# Patient Record
Sex: FEMALE | Race: ASIAN | Employment: OTHER | URBAN - METROPOLITAN AREA
[De-identification: names, ages, dates, MRNs, and addresses within clinical notes are randomized per-mention and may not be internally consistent; named-entity substitution may affect disease eponyms.]

---

## 2017-01-01 ENCOUNTER — HOSPITAL ENCOUNTER (INPATIENT)
Facility: HOSPITAL | Age: 82
LOS: 2 days | Discharge: HOME | DRG: 196 | End: 2017-01-01
Attending: EMERGENCY MEDICINE | Admitting: HOSPITALIST
Payer: MEDICARE

## 2017-01-01 ENCOUNTER — TELEPHONE (OUTPATIENT)
Dept: RHEUMATOLOGY | Facility: CLINIC | Age: 82
End: 2017-01-01

## 2017-01-01 ENCOUNTER — HOSPITAL ENCOUNTER (EMERGENCY)
Facility: HOSPITAL | Age: 82
Discharge: HOME | End: 2017-01-01
Attending: EMERGENCY MEDICINE
Payer: MEDICARE

## 2017-01-01 ENCOUNTER — TELEPHONE (OUTPATIENT)
Dept: SURGERY | Facility: CLINIC | Age: 82
End: 2017-01-01

## 2017-01-01 ENCOUNTER — OFFICE VISIT (OUTPATIENT)
Dept: INTERNAL MEDICINE CLINIC | Facility: CLINIC | Age: 82
End: 2017-01-01

## 2017-01-01 ENCOUNTER — TELEPHONE (OUTPATIENT)
Dept: NEUROLOGY | Facility: CLINIC | Age: 82
End: 2017-01-01

## 2017-01-01 ENCOUNTER — APPOINTMENT (OUTPATIENT)
Dept: NUCLEAR MEDICINE | Facility: HOSPITAL | Age: 82
DRG: 196 | End: 2017-01-01
Attending: EMERGENCY MEDICINE
Payer: MEDICARE

## 2017-01-01 ENCOUNTER — APPOINTMENT (OUTPATIENT)
Dept: ULTRASOUND IMAGING | Facility: HOSPITAL | Age: 82
DRG: 196 | End: 2017-01-01
Attending: HOSPITALIST
Payer: MEDICARE

## 2017-01-01 ENCOUNTER — HOSPITAL ENCOUNTER (OUTPATIENT)
Dept: MRI IMAGING | Facility: HOSPITAL | Age: 82
Discharge: HOME | End: 2017-01-01
Attending: Other
Payer: MEDICARE

## 2017-01-01 ENCOUNTER — HOSPITAL ENCOUNTER (OUTPATIENT)
Dept: CT IMAGING | Facility: HOSPITAL | Age: 82
Discharge: HOME | End: 2017-01-01
Attending: INTERNAL MEDICINE
Payer: MEDICARE

## 2017-01-01 ENCOUNTER — PATIENT OUTREACH (OUTPATIENT)
Dept: CASE MANAGEMENT | Age: 82
End: 2017-01-01

## 2017-01-01 ENCOUNTER — APPOINTMENT (OUTPATIENT)
Dept: CV DIAGNOSTICS | Facility: HOSPITAL | Age: 82
DRG: 196 | End: 2017-01-01
Attending: HOSPITALIST
Payer: MEDICARE

## 2017-01-01 ENCOUNTER — APPOINTMENT (OUTPATIENT)
Dept: GENERAL RADIOLOGY | Facility: HOSPITAL | Age: 82
DRG: 196 | End: 2017-01-01
Attending: EMERGENCY MEDICINE
Payer: MEDICARE

## 2017-01-01 VITALS
HEART RATE: 90 BPM | BODY MASS INDEX: 22.13 KG/M2 | DIASTOLIC BLOOD PRESSURE: 72 MMHG | RESPIRATION RATE: 16 BRPM | WEIGHT: 98.38 LBS | HEIGHT: 56 IN | SYSTOLIC BLOOD PRESSURE: 130 MMHG | TEMPERATURE: 98 F | OXYGEN SATURATION: 94 %

## 2017-01-01 VITALS
HEIGHT: 57 IN | BODY MASS INDEX: 19.41 KG/M2 | HEART RATE: 90 BPM | RESPIRATION RATE: 16 BRPM | SYSTOLIC BLOOD PRESSURE: 160 MMHG | OXYGEN SATURATION: 97 % | DIASTOLIC BLOOD PRESSURE: 78 MMHG | TEMPERATURE: 98 F | WEIGHT: 90 LBS

## 2017-01-01 VITALS
BODY MASS INDEX: 19.41 KG/M2 | HEIGHT: 57 IN | WEIGHT: 89.94 LBS | RESPIRATION RATE: 20 BRPM | DIASTOLIC BLOOD PRESSURE: 79 MMHG | HEART RATE: 83 BPM | SYSTOLIC BLOOD PRESSURE: 140 MMHG | OXYGEN SATURATION: 96 % | TEMPERATURE: 98 F

## 2017-01-01 VITALS
HEIGHT: 57.5 IN | RESPIRATION RATE: 22 BRPM | OXYGEN SATURATION: 98 % | WEIGHT: 93.25 LBS | BODY MASS INDEX: 19.84 KG/M2 | HEART RATE: 92 BPM | DIASTOLIC BLOOD PRESSURE: 60 MMHG | SYSTOLIC BLOOD PRESSURE: 110 MMHG

## 2017-01-01 DIAGNOSIS — M35.00 SJOGREN'S SYNDROME, WITH UNSPECIFIED ORGAN INVOLVEMENT (HCC): ICD-10-CM

## 2017-01-01 DIAGNOSIS — J44.1 COPD EXACERBATION (HCC): ICD-10-CM

## 2017-01-01 DIAGNOSIS — J40 BRONCHITIS: ICD-10-CM

## 2017-01-01 DIAGNOSIS — D64.9 ANEMIA, UNSPECIFIED TYPE: ICD-10-CM

## 2017-01-01 DIAGNOSIS — J96.01 ACUTE RESPIRATORY FAILURE WITH HYPOXIA (HCC): Primary | ICD-10-CM

## 2017-01-01 DIAGNOSIS — J84.10 PULMONARY FIBROSIS (HCC): ICD-10-CM

## 2017-01-01 DIAGNOSIS — J47.9 ADULT BRONCHIECTASIS (HCC): ICD-10-CM

## 2017-01-01 DIAGNOSIS — J44.1 COPD EXACERBATION (HCC): Primary | ICD-10-CM

## 2017-01-01 DIAGNOSIS — G43.709 CHRONIC MIGRAINE WITHOUT AURA WITHOUT STATUS MIGRAINOSUS, NOT INTRACTABLE: ICD-10-CM

## 2017-01-01 DIAGNOSIS — R31.9 URINARY TRACT INFECTION WITH HEMATURIA, SITE UNSPECIFIED: Primary | ICD-10-CM

## 2017-01-01 DIAGNOSIS — M05.79 RHEUMATOID ARTHRITIS, SEROPOSITIVE, MULTIPLE SITES (HCC): Primary | ICD-10-CM

## 2017-01-01 DIAGNOSIS — Z02.9 ENCOUNTERS FOR ADMINISTRATIVE PURPOSE: ICD-10-CM

## 2017-01-01 DIAGNOSIS — R33.9 URINARY RETENTION: ICD-10-CM

## 2017-01-01 DIAGNOSIS — N39.0 URINARY TRACT INFECTION WITH HEMATURIA, SITE UNSPECIFIED: Primary | ICD-10-CM

## 2017-01-01 PROCEDURE — 99222 1ST HOSP IP/OBS MODERATE 55: CPT | Performed by: CLINICAL NURSE SPECIALIST

## 2017-01-01 PROCEDURE — 87086 URINE CULTURE/COLONY COUNT: CPT | Performed by: EMERGENCY MEDICINE

## 2017-01-01 PROCEDURE — 99239 HOSP IP/OBS DSCHRG MGMT >30: CPT | Performed by: HOSPITALIST

## 2017-01-01 PROCEDURE — 36415 COLL VENOUS BLD VENIPUNCTURE: CPT

## 2017-01-01 PROCEDURE — 80053 COMPREHEN METABOLIC PANEL: CPT | Performed by: EMERGENCY MEDICINE

## 2017-01-01 PROCEDURE — 99284 EMERGENCY DEPT VISIT MOD MDM: CPT

## 2017-01-01 PROCEDURE — 94640 AIRWAY INHALATION TREATMENT: CPT

## 2017-01-01 PROCEDURE — 99232 SBSQ HOSP IP/OBS MODERATE 35: CPT | Performed by: HOSPITALIST

## 2017-01-01 PROCEDURE — 71010 XR CHEST AP PORTABLE  (CPT=71010): CPT | Performed by: EMERGENCY MEDICINE

## 2017-01-01 PROCEDURE — 78582 LUNG VENTILAT&PERFUS IMAGING: CPT | Performed by: EMERGENCY MEDICINE

## 2017-01-01 PROCEDURE — 93306 TTE W/DOPPLER COMPLETE: CPT | Performed by: HOSPITALIST

## 2017-01-01 PROCEDURE — 99496 TRANSJ CARE MGMT HIGH F2F 7D: CPT | Performed by: PHYSICIAN ASSISTANT

## 2017-01-01 PROCEDURE — 99213 OFFICE O/P EST LOW 20 MIN: CPT | Performed by: NURSE PRACTITIONER

## 2017-01-01 PROCEDURE — 93970 EXTREMITY STUDY: CPT | Performed by: HOSPITALIST

## 2017-01-01 PROCEDURE — 70551 MRI BRAIN STEM W/O DYE: CPT | Performed by: OTHER

## 2017-01-01 PROCEDURE — 85025 COMPLETE CBC W/AUTO DIFF WBC: CPT | Performed by: EMERGENCY MEDICINE

## 2017-01-01 PROCEDURE — 99223 1ST HOSP IP/OBS HIGH 75: CPT | Performed by: HOSPITALIST

## 2017-01-01 PROCEDURE — 81001 URINALYSIS AUTO W/SCOPE: CPT | Performed by: EMERGENCY MEDICINE

## 2017-01-01 PROCEDURE — 71250 CT THORAX DX C-: CPT | Performed by: INTERNAL MEDICINE

## 2017-01-01 RX ORDER — IPRATROPIUM BROMIDE AND ALBUTEROL SULFATE 2.5; .5 MG/3ML; MG/3ML
3 SOLUTION RESPIRATORY (INHALATION)
Status: DISCONTINUED | OUTPATIENT
Start: 2017-01-01 | End: 2017-01-01

## 2017-01-01 RX ORDER — CODEINE PHOSPHATE AND GUAIFENESIN 10; 100 MG/5ML; MG/5ML
5 SOLUTION ORAL NIGHTLY PRN
Qty: 236 ML | Refills: 0 | Status: SHIPPED | OUTPATIENT
Start: 2017-01-01 | End: 2018-01-01

## 2017-01-01 RX ORDER — MELOXICAM 15 MG/1
15 TABLET ORAL DAILY
Qty: 30 TABLET | Refills: 1 | Status: SHIPPED | OUTPATIENT
Start: 2017-01-01 | End: 2017-01-01

## 2017-01-01 RX ORDER — CODEINE PHOSPHATE AND GUAIFENESIN 10; 100 MG/5ML; MG/5ML
10 SOLUTION ORAL NIGHTLY PRN
Qty: 236 ML | Refills: 0 | Status: SHIPPED | OUTPATIENT
Start: 2017-01-01 | End: 2017-01-01 | Stop reason: DRUGHIGH

## 2017-01-01 RX ORDER — METHYLPREDNISOLONE SODIUM SUCCINATE 125 MG/2ML
125 INJECTION, POWDER, LYOPHILIZED, FOR SOLUTION INTRAMUSCULAR; INTRAVENOUS EVERY 6 HOURS
Status: DISCONTINUED | OUTPATIENT
Start: 2017-01-01 | End: 2017-01-01

## 2017-01-01 RX ORDER — ENOXAPARIN SODIUM 100 MG/ML
40 INJECTION SUBCUTANEOUS DAILY
Status: DISCONTINUED | OUTPATIENT
Start: 2017-01-01 | End: 2017-01-01

## 2017-01-01 RX ORDER — CODEINE PHOSPHATE AND GUAIFENESIN 10; 100 MG/5ML; MG/5ML
5 SOLUTION ORAL NIGHTLY PRN
Status: DISCONTINUED | OUTPATIENT
Start: 2017-01-01 | End: 2017-01-01

## 2017-01-01 RX ORDER — ACETAMINOPHEN 325 MG/1
650 TABLET ORAL EVERY 6 HOURS PRN
Status: DISCONTINUED | OUTPATIENT
Start: 2017-01-01 | End: 2017-01-01

## 2017-01-01 RX ORDER — BUDESONIDE 0.5 MG/2ML
0.5 INHALANT ORAL 2 TIMES DAILY
Status: DISCONTINUED | OUTPATIENT
Start: 2017-01-01 | End: 2017-01-01

## 2017-01-01 RX ORDER — BACLOFEN 10 MG/1
10 TABLET ORAL 2 TIMES DAILY PRN
Qty: 20 TABLET | Refills: 1 | Status: SHIPPED | OUTPATIENT
Start: 2017-01-01

## 2017-01-01 RX ORDER — SODIUM CHLORIDE 9 MG/ML
INJECTION, SOLUTION INTRAVENOUS CONTINUOUS
Status: DISCONTINUED | OUTPATIENT
Start: 2017-01-01 | End: 2017-01-01

## 2017-01-01 RX ORDER — LORAZEPAM 2 MG/ML
0.5 INJECTION INTRAMUSCULAR ONCE
Status: COMPLETED | OUTPATIENT
Start: 2017-01-01 | End: 2017-01-01

## 2017-01-01 RX ORDER — NITROFURANTOIN 25; 75 MG/1; MG/1
100 CAPSULE ORAL ONCE
Status: COMPLETED | OUTPATIENT
Start: 2017-01-01 | End: 2017-01-01

## 2017-01-01 RX ORDER — ONDANSETRON 2 MG/ML
4 INJECTION INTRAMUSCULAR; INTRAVENOUS EVERY 4 HOURS PRN
Status: DISCONTINUED | OUTPATIENT
Start: 2017-01-01 | End: 2017-01-01

## 2017-01-01 RX ORDER — METHYLPREDNISOLONE SODIUM SUCCINATE 125 MG/2ML
60 INJECTION, POWDER, LYOPHILIZED, FOR SOLUTION INTRAMUSCULAR; INTRAVENOUS EVERY 8 HOURS
Status: DISCONTINUED | OUTPATIENT
Start: 2017-01-01 | End: 2017-01-01

## 2017-01-01 RX ORDER — PHENAZOPYRIDINE HYDROCHLORIDE 100 MG/1
TABLET, FILM COATED ORAL 3 TIMES DAILY PRN
Qty: 20 TABLET | Refills: 0 | Status: SHIPPED | OUTPATIENT
Start: 2017-01-01 | End: 2017-01-01 | Stop reason: ALTCHOICE

## 2017-01-01 RX ORDER — BACLOFEN 10 MG/1
10 TABLET ORAL 2 TIMES DAILY PRN
Status: DISCONTINUED | OUTPATIENT
Start: 2017-01-01 | End: 2017-01-01

## 2017-01-01 RX ORDER — METHYLPREDNISOLONE SODIUM SUCCINATE 125 MG/2ML
125 INJECTION, POWDER, LYOPHILIZED, FOR SOLUTION INTRAMUSCULAR; INTRAVENOUS ONCE
Status: COMPLETED | OUTPATIENT
Start: 2017-01-01 | End: 2017-01-01

## 2017-01-01 RX ORDER — IPRATROPIUM BROMIDE AND ALBUTEROL SULFATE 2.5; .5 MG/3ML; MG/3ML
3 SOLUTION RESPIRATORY (INHALATION) ONCE
Status: COMPLETED | OUTPATIENT
Start: 2017-01-01 | End: 2017-01-01

## 2017-01-01 RX ORDER — SUMATRIPTAN 100 MG/1
100 TABLET, FILM COATED ORAL DAILY PRN
COMMUNITY

## 2017-01-01 RX ORDER — PREDNISONE 20 MG/1
40 TABLET ORAL
Status: DISCONTINUED | OUTPATIENT
Start: 2017-01-01 | End: 2017-01-01

## 2017-01-01 RX ORDER — HEPARIN SODIUM 5000 [USP'U]/ML
5000 INJECTION, SOLUTION INTRAVENOUS; SUBCUTANEOUS EVERY 12 HOURS SCHEDULED
Status: DISCONTINUED | OUTPATIENT
Start: 2017-01-01 | End: 2017-01-01

## 2017-01-01 RX ORDER — IBUPROFEN 400 MG/1
400 TABLET ORAL 4 TIMES DAILY
Status: DISCONTINUED | OUTPATIENT
Start: 2017-01-01 | End: 2017-01-01

## 2017-01-01 RX ORDER — ALBUTEROL SULFATE 2.5 MG/3ML
2.5 SOLUTION RESPIRATORY (INHALATION) EVERY 6 HOURS PRN
Status: DISCONTINUED | OUTPATIENT
Start: 2017-01-01 | End: 2017-01-01

## 2017-01-01 RX ORDER — ACETAMINOPHEN AND CODEINE PHOSPHATE 300; 30 MG/1; MG/1
1 TABLET ORAL EVERY 6 HOURS PRN
Status: DISCONTINUED | OUTPATIENT
Start: 2017-01-01 | End: 2017-01-01

## 2017-01-01 RX ORDER — PREDNISONE 20 MG/1
TABLET ORAL
Qty: 15 TABLET | Refills: 0 | Status: SHIPPED | OUTPATIENT
Start: 2017-01-01

## 2017-01-01 RX ORDER — ARFORMOTEROL TARTRATE 15 UG/2ML
15 SOLUTION RESPIRATORY (INHALATION)
Status: DISCONTINUED | OUTPATIENT
Start: 2017-01-01 | End: 2017-01-01

## 2017-01-01 RX ORDER — ARFORMOTEROL TARTRATE 15 UG/2ML
15 SOLUTION RESPIRATORY (INHALATION) 2 TIMES DAILY
Status: DISCONTINUED | OUTPATIENT
Start: 2017-01-01 | End: 2017-01-01

## 2017-01-01 RX ORDER — NITROFURANTOIN 25; 75 MG/1; MG/1
100 CAPSULE ORAL 2 TIMES DAILY
Qty: 20 CAPSULE | Refills: 0 | Status: SHIPPED | OUTPATIENT
Start: 2017-01-01 | End: 2017-01-01 | Stop reason: ALTCHOICE

## 2017-01-01 RX ORDER — CODEINE PHOSPHATE AND GUAIFENESIN 10; 100 MG/5ML; MG/5ML
5 SOLUTION ORAL NIGHTLY PRN
Qty: 236 ML | Refills: 0 | Status: SHIPPED | OUTPATIENT
Start: 2017-01-01 | End: 2017-01-01

## 2017-01-01 RX ORDER — BUDESONIDE 0.5 MG/2ML
0.5 INHALANT ORAL
Status: DISCONTINUED | OUTPATIENT
Start: 2017-01-01 | End: 2017-01-01

## 2017-02-01 ENCOUNTER — TELEPHONE (OUTPATIENT)
Dept: INTERNAL MEDICINE CLINIC | Facility: CLINIC | Age: 82
End: 2017-02-01

## 2017-02-01 ENCOUNTER — TELEPHONE (OUTPATIENT)
Dept: RHEUMATOLOGY | Facility: CLINIC | Age: 82
End: 2017-02-01

## 2017-02-01 NOTE — TELEPHONE ENCOUNTER
LOV 6/21/16 w AS instructions to RTC 6mo 12/21/16  Pt states that she has oral thrush and extremely dry mouth form the S.E of her rheumatoid arthritis medication. Pt states that AS is aware and has rx something before to help her with this.  Pt does not rec

## 2017-02-03 NOTE — TELEPHONE ENCOUNTER
S/w Pt states that medication was given to her by her RHEUMATOLOGIST. Pt states she will call back once she needs to f/u in June as instructed at [address] [place] [address]. Pt had no further questions.

## 2017-02-06 RX ORDER — ALBUTEROL SULFATE 2.5 MG/3ML
SOLUTION RESPIRATORY (INHALATION)
Qty: 360 ML | Refills: 0 | Status: SHIPPED | OUTPATIENT
Start: 2017-02-06 | End: 2017-05-02

## 2017-02-06 NOTE — TELEPHONE ENCOUNTER
LFV:6/21/16 with AS  Future Appt: none on file  Last Rx:9/14/16 for 360 ml  No asthma flowsheet hx  Per protocol called patient, LMTCB to discuss ACT/AAP

## 2017-02-15 ENCOUNTER — OFFICE VISIT (OUTPATIENT)
Dept: INTERNAL MEDICINE CLINIC | Facility: CLINIC | Age: 82
End: 2017-02-15

## 2017-02-15 VITALS
HEIGHT: 59 IN | HEART RATE: 92 BPM | BODY MASS INDEX: 21.77 KG/M2 | SYSTOLIC BLOOD PRESSURE: 116 MMHG | DIASTOLIC BLOOD PRESSURE: 74 MMHG | WEIGHT: 108 LBS | TEMPERATURE: 97 F

## 2017-02-15 DIAGNOSIS — R22.1 NECK SWELLING: Primary | ICD-10-CM

## 2017-02-15 PROCEDURE — 99213 OFFICE O/P EST LOW 20 MIN: CPT | Performed by: NURSE PRACTITIONER

## 2017-02-15 NOTE — PROGRESS NOTES
Yann Garay is a 80year old female. Patient presents with:  Swelling: neck swelling for 3 days. She had neck surgery and has already seen the surgeon       HPI:   Here for neck swelling x 3 days.   She had plastic surgery recently 2/13 with Dr Noni Hill HCl 4 mg Oral tablet Take 1 tab PO BID RPN Disp: 30 tablet Rfl: 6   OnabotulinumtoxinA (BOTOX) 200 UNITS Injection Recon Soln Physician to Inject into selected selected sites in face and neck Disp: 200 Units Rfl: 3   diazepam 5 MG Oral Tab Take 5 mg by damien or swelling noted at suture sites. HEENT: atraumatic, normocephalic,ears and throat are clear  NECK: supple,no adenopathy,  LUNGS: normal rate without respiratory distress, harsh bs bilaterally without wheezing.    CARDIO: RRR   PSYCH: alert and oriented

## 2017-03-09 ENCOUNTER — TELEPHONE (OUTPATIENT)
Dept: RHEUMATOLOGY | Facility: CLINIC | Age: 82
End: 2017-03-09

## 2017-03-09 NOTE — TELEPHONE ENCOUNTER
Called pt back, she stated mouth still sore and has thrush in mouth. Instructed pt to make appt with ENT-phone number given to pt. Verbalized understanding.

## 2017-03-21 RX ORDER — SUMATRIPTAN 100 MG/1
TABLET, FILM COATED ORAL
Qty: 9 TABLET | Refills: 0 | Status: ON HOLD | OUTPATIENT
Start: 2017-03-21 | End: 2017-01-01 | Stop reason: SDUPTHER

## 2017-03-21 NOTE — TELEPHONE ENCOUNTER
Spoke to patient and inform her that she needs to make appointment to receive further refills. Patient stated that she will make appointment because her headaches our getting worse. Patient was transfer to the  to make appointment.      Medication

## 2017-03-28 ENCOUNTER — TELEPHONE (OUTPATIENT)
Dept: RHEUMATOLOGY | Facility: CLINIC | Age: 82
End: 2017-03-28

## 2017-03-30 RX ORDER — SUMATRIPTAN 100 MG/1
TABLET, FILM COATED ORAL
Qty: 9 TABLET | Refills: 0 | OUTPATIENT
Start: 2017-03-30

## 2017-03-31 ENCOUNTER — TELEPHONE (OUTPATIENT)
Dept: NEUROLOGY | Facility: CLINIC | Age: 82
End: 2017-03-31

## 2017-03-31 NOTE — TELEPHONE ENCOUNTER
Patient needs to make a follow up appointment in order to receive any further refills. Patient cancelled on 03/27/17. Need to correct sig on Sumatriptan order to read PRN and not to be taken daily as written.

## 2017-03-31 NOTE — TELEPHONE ENCOUNTER
Contacted patient to see if willing to schedule an appointment and the need to complete a prior authorization for Sumatriptan due to a 9 tablet quantity limit.

## 2017-04-04 ENCOUNTER — TELEPHONE (OUTPATIENT)
Dept: INTERNAL MEDICINE CLINIC | Facility: CLINIC | Age: 82
End: 2017-04-04

## 2017-04-05 NOTE — TELEPHONE ENCOUNTER
LOV 2/15/17 with SD instructions RTC if sx worsen  Pt would like to know if there are any other options in her POC to address pain she is having d/t sjogrens. Pt states that she has a lot of pain in her nose, eyes, and a cough that does not let her sleep.

## 2017-04-05 NOTE — TELEPHONE ENCOUNTER
Informed Pt that per SD - Pt should see rheumatologist and neurologist since they should be dealing with her Sjogrens and pain associated with it.  Pt verbalized understanding, agreed with POC to contact rheumatologist and neurologist. Pt advised to call ba

## 2017-04-05 NOTE — TELEPHONE ENCOUNTER
Several attempts to reach patient to schedule follow up appointment. Last office visit with Dr Autumn Stafford 4/25/16. When patient returns call, please help patient schedule follow up appt in clinic and then medication- Sumatriptan can be processed.

## 2017-04-05 NOTE — TELEPHONE ENCOUNTER
Per my note at ov, I saw her for neck swelling post surgery and it was ecchymosis and swelling from surgery. Plan was to see her back if not improved. This pain is unrelated to my ov with her.      She sees a rheumatologist and neurologist who should be

## 2017-04-10 PROBLEM — R09.81 NASAL CONGESTION: Status: ACTIVE | Noted: 2017-04-10

## 2017-04-25 ENCOUNTER — HOSPITAL ENCOUNTER (OUTPATIENT)
Dept: MAMMOGRAPHY | Age: 82
Discharge: HOME | End: 2017-04-25
Attending: OBSTETRICS & GYNECOLOGY
Payer: MEDICARE

## 2017-04-25 DIAGNOSIS — Z12.31 ENCOUNTER FOR SCREENING MAMMOGRAM FOR MALIGNANT NEOPLASM OF BREAST: ICD-10-CM

## 2017-04-25 PROCEDURE — 77067 SCR MAMMO BI INCL CAD: CPT

## 2017-05-02 ENCOUNTER — OFFICE VISIT (OUTPATIENT)
Dept: NEUROLOGY | Facility: CLINIC | Age: 82
End: 2017-05-02

## 2017-05-02 VITALS
HEART RATE: 79 BPM | WEIGHT: 100 LBS | TEMPERATURE: 98 F | OXYGEN SATURATION: 89 % | RESPIRATION RATE: 24 BRPM | SYSTOLIC BLOOD PRESSURE: 170 MMHG | HEIGHT: 59 IN | BODY MASS INDEX: 20.16 KG/M2 | DIASTOLIC BLOOD PRESSURE: 78 MMHG

## 2017-05-02 DIAGNOSIS — G43.709 CHRONIC MIGRAINE WITHOUT AURA WITHOUT STATUS MIGRAINOSUS, NOT INTRACTABLE: Primary | ICD-10-CM

## 2017-05-02 DIAGNOSIS — R51.9 CHRONIC DAILY HEADACHE: ICD-10-CM

## 2017-05-02 PROCEDURE — 99214 OFFICE O/P EST MOD 30 MIN: CPT | Performed by: OTHER

## 2017-05-02 RX ORDER — AZITHROMYCIN 250 MG/1
250 TABLET, FILM COATED ORAL
COMMUNITY
Start: 2017-05-01 | End: 2017-01-01

## 2017-05-02 RX ORDER — VERAPAMIL HYDROCHLORIDE 100 MG/1
100 CAPSULE, EXTENDED RELEASE ORAL DAILY
Qty: 30 CAPSULE | Refills: 5 | Status: SHIPPED | OUTPATIENT
Start: 2017-05-02 | End: 2017-01-01

## 2017-05-02 RX ORDER — ALBUTEROL SULFATE 1.5 MG/3ML
2.5 SOLUTION RESPIRATORY (INHALATION)
COMMUNITY
Start: 2017-04-27

## 2017-05-02 NOTE — PATIENT INSTRUCTIONS
1. Take Imitrex only for moderate to severe headache  2. Start Verapamil 100 mg daily  3.  Complete MRI brain

## 2017-05-04 NOTE — PROGRESS NOTES
HPI:    Patient ID: Nunu Amos is a [de-identified]year old female. HPI    Patient is a [de-identified]year old female who presented to establish care with me for migraines. She was previously seen by my associate Dr Brooks Courtney and last office visit was in April 2016.  She histor are negative. Current Outpatient Prescriptions:  azithromycin (ZITHROMAX Z-JERILYN) 250 MG Oral Tab Take 250 mg by mouth. Disp:  Rfl:    Albuterol Sulfate 1.25 MG/3ML Inhalation Nebu Soln Inhale 2.5 mg into the lungs.  Disp:  Rfl:    Verapamil HCl ER Physical Exam    Vitals reviewed  General: A [de-identified]year old female  Head: Normocephalic and atraumatic. Collapsed nasal bridge  Neck: supple  Cardiovascular: Normal rate, regular rhythm and normal heart sounds.     Pulmonary/Chest: Effort normal and breath s well in the past according to the patient.     Follow up in 1-2 months        30 total minutes spent with patient >50% of visit was spent in counseling and coordination of care      Gene Cross MD  [facility] This Visit:  S

## 2017-05-08 NOTE — TELEPHONE ENCOUNTER
Started PA [facility] online for MRI Brain cpt code 56687. Called insurance to confirm.  Spoke with rep Reecer[employer] Overtonlb  No prior auth required  Case# [id number]  Call duration 4:37    Pt is not scheduled at this time for test. Contacted pt and advised of response to co

## 2017-07-05 NOTE — TELEPHONE ENCOUNTER
Patients  calling to obtain central scheduling number in order to schedule MRI. Provided number. Patient verbalized understanding and has no further questions or concerns at this time.

## 2017-08-31 NOTE — TELEPHONE ENCOUNTER
Patient called today at 2:20pm and asked who she was talking to and I told her who I was, then the patient swore at me profoundly and said she hopes I rot in hell, She spoke with a co-worker earlier and thought she was talking to that person and did not re

## 2017-08-31 NOTE — TELEPHONE ENCOUNTER
Pt called. Pt states 'is having a lot pain.  Would like Dr. Jazz Graham to give pain medication- is willing to pay any amount of money, is in a lot of pain.' CMA informed pt., would need an appointment with Dr. Jazz Graham and unfortunately is completely booked today

## 2017-09-05 NOTE — TELEPHONE ENCOUNTER
S: Patient would like cervical MRI for neck pain. B: Per Dr. Amandeep Arevalo at [facility] on 05/02/17:  Plan: Increased headaches due to multiple factors. I would suggest getting MRI brain given her age and worsening headaches.  I would suggest taking Elavil regular

## 2017-09-05 NOTE — TELEPHONE ENCOUNTER
MRI brain obtained was negative. Is patient taking Elavil and Verapamil regularly. We would need her to see for neck pain to see if she requires MRI C spine.

## 2017-09-05 NOTE — TELEPHONE ENCOUNTER
Relayed doctors recommendation to patient. Patient verbalized understanding and has no questions or concerns at this time. Call transferred to [facility] and appointment scheduled.

## 2017-09-18 NOTE — PROGRESS NOTES
Patient presents with:  Headache: \"since 13 yrs old\"  Arthritis      HPI:  Presents with long time history of RA and Sjogren's disease which is causing her generalized aches and a frequent dry cough which is worse at night and gives her headaches sometim 100-10 MG/5ML Oral Solution Take 5 mL by mouth nightly as needed for cough. Disp: 236 mL Rfl: 0   Arformoterol Tartrate (BROVANA) 15 MCG/2ML Inhalation Nebu Soln Take 2 mL (15 mcg total) by nebulization 2 (two) times daily.  Disp: 120 mL Rfl: 11   budesonid Constitutional:  No distress. HEENT: Normocephalic and atraumatic. Tympanic membranes clear bilat. Oropharynx is pink and moist without lesions. Eyes: Conjunctivae are pink and moist without exudate or drainage. PERRLA. Neck: Normal range of motion.

## 2017-09-30 NOTE — ED INITIAL ASSESSMENT (HPI)
Pt states she's here for mid low abd pain and painful urination x3 days. Pt states she's also been incontinent in urine x3 days and worse last night.  Pt is also O2 dependent at 2.5 liters

## 2017-09-30 NOTE — ED NOTES
Pt reevaluated by er physician. Pt informed of all her test report and plan of care. Pt's urinary/standard bag changed to leg bag. Pt instructed. Verbalizing understanding.

## 2017-10-01 NOTE — ED PROVIDER NOTES
Patient Seen in: [facility] Emergency Department    History   Patient presents with:  Urinary Symptoms (urologic)    Stated Complaint: uti    HPI    Patient is worried about UTI symptoms with some burning urgency frequency over the past 3 days.   She Exam    General: Thin, mildly tachypneic, but patient says this is her baseline   head and neck: Normocephalic, atraumatic, pupils equal round and reactive to light, oropharynx clear   Neck: No JVD, no lymphadenopathy, no tenderness, swelling, deformity ============================================================  ED Course  ------------------------------------------------------------  MDM     Patient has signs and symptoms of a UTI.   Catheter was placed after discussion with patient and approximate

## 2017-10-03 PROBLEM — R33.9 RETENTION OF URINE: Status: ACTIVE | Noted: 2017-01-01

## 2017-10-16 ENCOUNTER — PRIOR ORIGINAL RECORDS (OUTPATIENT)
Dept: OTHER | Age: 82
End: 2017-10-16

## 2017-10-18 ENCOUNTER — PRIOR ORIGINAL RECORDS (OUTPATIENT)
Dept: OTHER | Age: 82
End: 2017-10-18

## 2017-10-18 PROBLEM — J44.1 COPD EXACERBATION (HCC): Status: ACTIVE | Noted: 2017-01-01

## 2017-10-18 PROBLEM — D64.9 ANEMIA, UNSPECIFIED TYPE: Status: ACTIVE | Noted: 2017-01-01

## 2017-10-18 PROBLEM — J40 BRONCHITIS: Status: ACTIVE | Noted: 2017-01-01

## 2017-10-18 NOTE — CONSULTS
Pulmonary / Critical Care H&P/Consult       NAME: Kvng Krishnamurthy - ROOM: C8/ - MRN: [id number] - Age: [de-identified]year old - :  1935    Date of Admission: 10/18/2017 12:39 PM  Admission Diagnosis: No admission diagnoses are documented for this encounter. Concern No    Special Diet No    Back Care No    Exercise Yes    Comment: 4 x per week    Bike Helmet Yes    Seat Belt Yes    Self-Exams Yes     Social History Narrative    ** Merged History Encounter **              Family History:  Family History   Probl Extremities:   Extremities normal, atraumatic, no cyanosis or edema   Pulses:   2+ and symmetric all extremities   Skin:   Skin color, texture, turgor normal, no rashes or lesions         Recent Labs   Lab  10/18/17   1321   WBC  9.4   HGB  11.1*   HCT

## 2017-10-18 NOTE — ED PROVIDER NOTES
She is time to right  Patient Seen in: [facility] Emergency Department    History   Patient presents with:  Dyspnea ROSELINE SOB (respiratory)    Stated Complaint: ROSELINE    HPI    In reviewing some the patient's medical records, I note patient was audibly sh of Systems  Limited secondary to patient's acute dyspnea    Physical Exam   ED Triage Vitals  BP: (!) 185/93 [10/18/17 1254]  Pulse: 90 [10/18/17 1254]  Resp: (!) 36 [10/18/17 1254]  Temp: 100.7 °F (38.2 °C) [10/18/17 1254]  Temp src: n/a  SpO2: (!) 78 % [ evaluation of the patient.    ABG PANEL W ELECT AND LACTATE - Abnormal; Notable for the following:     ABG pCO2 47 (*)     ABG HCO3 31.1 (*)     Total Hemoglobin 11.0 (*)     Sodium Blood Gas 134 (*)     All other components within normal limits   CBC W/ DI complicating her underlying lung disease. Sepsis bolus ordered  Blood culture and respiratory panel ordered    ABG shows pH 7.43 with PCO2 47 and PO2 85. Bicarb 31.   This reflects chronic CO2 retention without significant acidosis but some hypoxia  Lacti 9/18/2017          ICD-10-CM Noted POA    COPD exacerbation ([facility] J44.1 10/18/2017 Unknown

## 2017-10-18 NOTE — ED INITIAL ASSESSMENT (HPI)
Pt to ED with c/o SOB. Pt has been sick recently. Pt very dyspneic, grunting and difficulty speaking.

## 2017-10-18 NOTE — ED NOTES
Pt refusing BiPAP, pt states \"I'm not staying in the hospital\". Pt explained for the need for oxygen and BiPAP, pt adamant she doesn't want it. Dr. Alexia Olivo notified.

## 2017-10-18 NOTE — H&P
[facility]  History and Physical     Kvng Krishnamurthy Patient Status:  Emergency    1935 MRN [id number]   Location [address] Attending Rosemary Roberts MD   Hosp Day # 0 PCP Evert Walker MD     Chief Complaint: SOB Diarrhea  Aspirin                     Comment:STOMACH UPSET  Iodine [Radiology C*    Itching, Coughing    Comment:ABX's NOT USUALLY EFFECTIVE  Levofloxacin            Anaphylaxis  Lisinopril                  Comment:angiodema  Medications:    No c Disp: 9 tablet Rfl: 0   predniSONE 10 MG Oral Tab Take 1 tablet (10 mg total) by mouth daily.  Disp: 30 tablet Rfl: 3   Ondansetron HCl 4 mg Oral tablet Take 1 tab PO BID RPN Disp: 30 tablet Rfl: 6   OnabotulinumtoxinA (BOTOX) 200 UNITS Injection Recon Soln CREATSERUM  0.34*   CA  8.4   ALB  2.7*   NA  133*   K  3.8   CL  96*   CO2  32.0   ALKPHO  100   AST  22   ALT  26   BILT  0.2   TP  8.0     Recent Labs   Lab  10/18/17   1320   PTP  13.7   INR  1.05     Recent Labs   Lab  10/18/17   1320   TROP  <0.046

## 2017-10-18 NOTE — RESPIRATORY THERAPY NOTE
ATTEMPTED TO PLACE PATIENT ON BIPAP. PATIENT REFUSED MD NOTIFIED AND AWARE. PATIENT ON CONT NEBULIZER.

## 2017-10-18 NOTE — ED NOTES
Patient arrived back in department- arrived with 15 L NRB mask in place. Placed patient on monitor.  bedside.

## 2017-10-19 NOTE — PAYOR COMM NOTE
Omaira Amanda  (MR # [id number])   Order Admit to inpatient Once [ADT1] (Order [id number])   Order Requisition   Admit to inpatient Once (Order #[id number]) on 10/18/17        Question Answer   Diagnosis COPD exacerbation ([facility]   Level of Care Telemetry     A Vitals  BP: (!) 185/93   Pulse:  103  Resp: (!) 36   Temp: 100.7 °F   SpO2: (!) 78 % RA   BMI 19.46     Physical Exam  General: The patient is awake and alert. She can speak a few word sentences. She appears obviously dyspneic and tachypneic.   She has a 85.  Bicarb 31.   This reflects chronic CO2 retention without significant acidosis but some hypoxia  Lactic acid negative  d-dimer positive prompting VQ scan    Chest x-ray  Marked nodular opacities throughout bilateral upper lobes interstitial abnormalitie pulmonologist as outpatient which they are currently considering.     Current Outpatient Prescriptions on File Prior to Encounter:  predniSONE 10 MG Oral Tab 4 tabs daily for 3 days 3 tabs daily for 3 days then 2 tabs daily for 3 days then 1 tab daily for 3 mouth every 6 (six) hours as needed for Pain. Ibuprofen (ADVIL) 200 MG Oral Cap Take 2 capsules by mouth daily as needed. Physical Exam:    General: Cachectic and ill-appearing.   On another breathing treatment with high flow oxygen  Respiratory:Dim

## 2017-10-19 NOTE — PHYSICAL THERAPY NOTE
Order received, chart reviewed. Per chart, pt has doppler ordered to rule out DVT. Will follow up tomorrow.

## 2017-10-19 NOTE — OCCUPATIONAL THERAPY NOTE
OT orders received. Pt with VQ scan showing indeterminate probability for PE. Pulmonologist note recommending LE dopplers, not yet performed. Will hold at this time, follow up as appropriate. Discussed with RN.

## 2017-10-19 NOTE — CM/SW NOTE
Patient states she has home 02 per Cristopher. Wears 2/5 L. Has own nebulizer. CM/SW following for d/c needs.      10/19/17 1200   CM/SW Referral Data   Referral Source    Reason for Referral Discharge planning   Informant Patient;Spouse   Pert

## 2017-10-19 NOTE — RESPIRATORY THERAPY NOTE
PT NOT COMFORTABLE IN VENTURI MASK- EXPLAINED THAT ALL OF OUR MASKS WERE MADE OF THE SAME MATERIAL. PT REQUESTED TO USE OWN 'MASK' FROM HOME.  BROUGHT IT IN. IT IS A NASAL/MASK CANNULA. VERY SPECIFIC. PUT AT 8L AND PT SPO2 98%.

## 2017-10-19 NOTE — PROGRESS NOTES
Extensive conversation with pt,  and son who is a radiologist at [address] [facility],[address]. Son came in because pt wanted to leave AMA because \"she wants to be with family and in her own home. \" Son explained how she has end stage pulmonary fibrosis and they have

## 2017-10-19 NOTE — PROGRESS NOTES
[facility]  Progress Note     Pedro Luis Hardeeptrina Krishnamurthy Patient Status:  Inpatient    1935 MRN [id number]   [facility] 5NW-A Attending Dawit Hamm MD   Hosp Day # 1 PCP Sallie Vargas MD     Chief Complaint: SOB  S:  Breathing holly ipratropium-albuterol  3 mL Nebulization QID     ASSESSMENT / PLAN:   1. Acute on chronic hypoxic resp failure- refusing BiPAP. O2 sat 78% on RA initially- was on 12-15L- improving and weaning O2. PCT/resp panel unremarkable.  V/Q scan intermediate probabi

## 2017-10-19 NOTE — PLAN OF CARE
NURSING ADMISSION NOTE      Patient admitted via Cart  Oriented to room. Safety precautions initiated. Bed in low position. Call light in reach. IV ABX infusing upon arrival from ED, tolerating well.  Patient on 15 LPM NRB - maintains O2 sats > 90

## 2017-10-19 NOTE — PLAN OF CARE
Impaired Functional Mobility    • Achieve highest/safest level of mobility/gait Progressing        Impaired Swallowing    • Minimize aspiration risk Progressing        METABOLIC/FLUID AND ELECTROLYTES - ADULT    • Electrolytes maintained within normal limi

## 2017-10-19 NOTE — PROGRESS NOTES
Pharmacy Note: Renal dose adjustment of Meropenem    Dora Rivera is a [de-identified]year old female who has been prescribed Meropenem 500 mg IV every 8 hrs.   CrCl is estimated at 34 ml/min so the dose has been adjusted  to Meropenem 500 mg IV every 12h  per hospita

## 2017-10-19 NOTE — PROGRESS NOTES
Pulmonary Progress Note     Assessment / Plan:  1. Dyspnea / hypoxia: at baseline uses 2-3L. Likely due to exacerbation of her underlying lung disease. PE is less likely. D-dimer is normal when adjusted for age.  V/Q scan was obtained that was poor quality

## 2017-10-19 NOTE — SLP NOTE
ADULT SWALLOWING EVALUATION    ASSESSMENT    ASSESSMENT/OVERALL IMPRESSION:  Patient reports coughing with meals. Patient did pass the RN dysphagia screen. Patient seen in April 2016 with no clinical s/s of aspiration.    Patient seated upright in bed to 90 disease) ([facility]    • HEADACHES    • HYPERTENSION    • Insomnia, unspecified    • OSTEOARTHRITIS    • RHEUMATOID ARTHRITIS    • Sjogren's disease ([facility]        Prior Living Situation: Home with spouse  Diet Prior to Admission: Regular; Thin liquids  Precaution possible esophageal involvement    GOALS  Goal #1 The patient will tolerate regular consistency and thin liquids without overt signs or symptoms of aspiration with 95 % accuracy over 1 session(s).   In Progress   Goal #2 The patient/family/caregiver will de

## 2017-10-20 PROBLEM — Z51.5 PALLIATIVE CARE ENCOUNTER: Status: ACTIVE | Noted: 2017-01-01

## 2017-10-20 PROBLEM — Z71.89 GOALS OF CARE, COUNSELING/DISCUSSION: Status: ACTIVE | Noted: 2017-01-01

## 2017-10-20 NOTE — PHYSICAL THERAPY NOTE
PHYSICAL THERAPY QUICK EVALUATION - INPATIENT    Room Number: 438/442-U  Evaluation Date: 10/20/2017  Presenting Problem: COPD exacerbation  Physician Order: PT Eval and Treat    Problem List  Principal Problem:    COPD exacerbation ([address]  Active Problem over in bed (including adjusting bedclothes, sheets and blankets)?: None   -   Sitting down on and standing up from a chair with arms (e.g., wheelchair, bedside commode, etc.): None   -   Moving from lying on back to sitting on the side of the bed?: None mobility. Based on this evaluation, patient's clinical presentation is evolving and overall evaluation complexity is considered moderate.   Recommend home because pt is at her baseline for functional mobility and her home situation is safe w/ her retired h

## 2017-10-20 NOTE — PAYOR COMM NOTE
--------------  CONTINUED STAY REVIEW    Payor: [facility] [place] #:  [id number]  Authorization Number: [id number]    Admit date: 10/18/17  Admit time: 0668    ASSESSMENT / PLAN:   1.  Acute on chronic hypoxic resp failure- refusing BiPA Mario Alberto Machuca, RN      Heparin Sodium (Porcine) 5000 UNIT/ML injection 5,000 Units     Date Action Dose Route User    10/19/2017 2025 Given 5000 Units Subcutaneous (Left Lower Abdomen) Karsten Howe RN      ibuprofen (MOTRIN) tab 400 mg     Date Action Dose Rou

## 2017-10-20 NOTE — OCCUPATIONAL THERAPY NOTE
OCCUPATIONAL THERAPY QUICK EVALUATION - INPATIENT    Room Number: 776/880-P  Evaluation Date: 10/20/2017     Type of Evaluation: Initial and Quick Eval  Presenting Problem: COPD, pulmonary fibrosis    Physician Order: IP Consult to Occupational Therapy  Re the chair    OBJECTIVE  Precautions: None  Fall Risk: High fall risk    WEIGHT BEARING RESTRICTION  Weight Bearing Restriction: None                PAIN ASSESSMENT  Ratin          COGNITION  Follows one step commands consistently  Impaired insight into is demonstrating a  43% degree impairment in activities of daily living. Research supports that patients with this level of impairment often benefit from discharge home. Recommend 24 hour supervision secondary to balance deficits.      Patient Complexity

## 2017-10-20 NOTE — PROGRESS NOTES
[facility]  Progress Note     Otilio Krishnamurthy Patient Status:  Inpatient    1935 MRN [id number]   [facility] 5NW-A Attending Mohsen Biggs MD   Hosp Day # 2 PCP Duane Solorzano MD     Chief Complaint: SOB  S:  Breathing holly on chronic hypoxic resp failure- refusing BiPAP. O2 sat 78% on RA initially- was on 12-15L- continues to improve and back ot baseline O2 to 2.5L continuous. PCT/resp panel unremarkable.  V/Q scan intermediate probability but clinically I suspect exacerbati

## 2017-10-20 NOTE — CONSULTS
Palliative Care Consultation noted. Plan is for goals of care conversation with patient, son and palliative care at 21  this AM.  Full consultation note to follow meeting.   Thank you for the referral.  Mandi Hernandez, [facility] Patient very clearly states she does not want to be hospitalized now or at any time in the future. Patient prefers to be at home with her spouse. Patient does not want intubation, CPR or defibrillation. Patient does not want antibiotics.   Patient does no

## 2017-10-20 NOTE — PROGRESS NOTES
Multidisciplinary Discharge Rounds held 10/20/2017. Treatment team members present today include , , Charge Nurse,  Nurse, RT, PT and Pharmacy caring for [employer].      Other care providers present:    Patient Active Problem

## 2017-10-20 NOTE — CM/SW NOTE
10/20/17 1500   Discharge disposition   Discharged to: Home or [address] [place] [address] services after discharge Other (comment)  (referral to [facility] to meet with pt at home re: hospice)   Discharge transportation Private car

## 2017-10-20 NOTE — PLAN OF CARE
Impaired Functional Mobility    • Achieve highest/safest level of mobility/gait Adequate for Discharge        Impaired Swallowing    • Minimize aspiration risk Adequate for Discharge        METABOLIC/FLUID AND ELECTROLYTES - ADULT    • Electrolytes maintai

## 2017-10-20 NOTE — PROGRESS NOTES
NURSING DISCHARGE NOTE    Discharged Home via Wheelchair. Accompanied by Support staff  Belongings Taken by patient/family. Went over discharge packet with  and pt. Scripts for new medication given to . All questions answered.  PIV rem

## 2017-10-20 NOTE — PROGRESS NOTES
Pulmonary Progress Note     Assessment / Plan:  1. Dyspnea / hypoxia: at baseline uses 2-3L. Likely due to exacerbation of her underlying lung disease. PE is less likely. D-dimer is normal when adjusted for age.  V/Q scan was intermediate probability for PE

## 2017-10-20 NOTE — CM/SW NOTE
Spoke with Sharri LEMUS who stated plan for pt to discharge to home today. Pt/family are requesting referral to [facility] for hospice at home. Hospice to contact pt's son, Esha Upton to arrange for hospice meeting at home after DC.   Referral sent to

## 2017-10-21 NOTE — DISCHARGE SUMMARY
[facility] HOSPITALIST  DISCHARGE SUMMARY     Flaco Krishnamurthy Patient Status:  Inpatient    1935 MRN [id number]   [facility] 5NW-A Attending No att. providers found   Hosp Day # 2 PCP Zaid Cerrato MD     Date of Admission: 10/18/2017  Prasanth had increasing shortness of breath not improving with nebulizer treatments and prednisone. Patient is chronically on 3 L at home. She had a fever of 100.4 with some chills earlier today. She denies chest pain. She denies a productive cough.   She denies and 1:30PM   Refills:  0     predniSONE 20 MG Tabs  Commonly known as:  Varghese Cruz  What changed:  · medication strength  · how much to take  · how to take this  · when to take this  · additional instructions  · Another medication with the same name was rem Tabs      Take 1 tablet (15 mg total) by mouth daily. Quantity:  30 tablet  Refills:  1     NEUTRASAL Pack      Mix contents of one pouch with 30mL or water, swish 1/2 of solution in mouth for one minute and spit out.  Repeat with remainign 1/2 of solutio

## 2017-10-23 NOTE — PROGRESS NOTES
Initial Post Discharge Follow Up   Discharge Date: 10/20/17  Contact Date: 10/23/2017    Consent Verification:  Assessment Completed With: Patient  HIPAA Verified?   Yes    Discharge Dx:   Acute on chronic hypoxic resp failure, End stage pulmonary fibros stated she will review the list again at her office visit.      Current Outpatient Prescriptions:  predniSONE 20 MG Oral Tab Take 2 tabs daily x5 days, then one tab daily x5 days Disp: 15 tablet Rfl: 0   SUMAtriptan Succinate 100 MG Oral Tab Take 100 mg by • When you were leaving the hospital were any medication changes discussed with you? yes  • If you were prescribed a new medication:  No new medications were prescribed- changes were made to previous medications.    • May I go over your medications with [address] [facility], [address] [address] [facility] [id number] [address] [facility] [address] at [address]

## 2017-10-26 NOTE — PROGRESS NOTES
HPI:    Nunu Amos is a [de-identified]year old female here today for hospital follow up.    She was discharged from Inpatient hospital, [facility] to Home   Admission Date: 10/18/17   Discharge Date: 10/20/17  Hospital Discharge Diagnosis: Asthma exacerbatio today.  She denies any increase in ROSELINE and SOB. She denies CP. She does not desire any further assistance today. Pt is on home oxygen and has nebulizer to use at home supplied by [employer] (per ).     While pt was in hospital she had VQ scan, labs, C diazepam 5 MG Oral Tab Take 5 mg by mouth 3 (three) times daily. Taken at 9:00AM and 1:30PM    Acetaminophen-Codeine #3 (TYLENOL #3) 300-30 MG Oral Tab Take 1 tablet by mouth every 6 (six) hours as needed for Pain.    Ibuprofen (ADVIL) 200 MG Oral Cap [place] no chest tenderness  LUNGS: + scattered rhonchi and crackles throughout, coarse sounds.  + BS throughout.   CARDIO: RRR without murmur  GI: no masses, HSM or tenderness  EXTREMITIES: no edema    ASSESSMENT/ PLAN:   Diagnoses and all orders for this visit:

## 2017-12-21 NOTE — TELEPHONE ENCOUNTER
LOV:10/26/2017   Upcoming Appt:  None  Last Labs:10/18/2017 cbc, ptt, ddimer cmpn  Last Refill; 9/18./2017 , 0 refills         Per protocol route to provider

## 2018-01-01 ENCOUNTER — TELEPHONE (OUTPATIENT)
Dept: INTERNAL MEDICINE CLINIC | Facility: CLINIC | Age: 83
End: 2018-01-01

## 2018-01-01 RX ORDER — ALBUTEROL SULFATE 2.5 MG/3ML
SOLUTION RESPIRATORY (INHALATION)
Qty: 360 ML | Refills: 3 | Status: SHIPPED | OUTPATIENT
Start: 2018-01-01 | End: 2018-01-01

## 2018-01-01 RX ORDER — OXYBUTYNIN CHLORIDE 5 MG/1
TABLET ORAL
Qty: 236 ML | Refills: 0 | OUTPATIENT
Start: 2018-01-01

## 2018-01-02 NOTE — TELEPHONE ENCOUNTER
LOV: 10/26/17   Future office visit: No upcoming visit  Last labs: 10/18/17 Cmp Cbc   Last RX: N/A in med list  Per protocol: Route to provider

## 2018-01-02 NOTE — TELEPHONE ENCOUNTER
Pt and spouse called the office upset that we are unable to refill cheratussin. Pt was seen back in September by CB and prescribed Cheratussin for cough, pt spouse states that pt has been taking it daily since September before bed for cough.   Advised spou

## 2018-05-15 ENCOUNTER — TELEPHONE (OUTPATIENT)
Dept: INTERNAL MEDICINE CLINIC | Facility: CLINIC | Age: 83
End: 2018-05-15

## 2019-02-28 VITALS
SYSTOLIC BLOOD PRESSURE: 160 MMHG | DIASTOLIC BLOOD PRESSURE: 78 MMHG | WEIGHT: 93 LBS | HEART RATE: 95 BPM | OXYGEN SATURATION: 93 % | HEIGHT: 56 IN | BODY MASS INDEX: 20.92 KG/M2

## (undated) NOTE — ED AVS SNAPSHOT
Osbaldo Choi   MRN: [id number]    Department:  [facility] Emergency Department   Date of Visit:  9/30/2017           Disclosure     Insurance plans vary and the physician(s) referred by the ER may not be covered by your plan.  Please contact your If you have been prescribed any medication(s), please fill your prescription right away and begin taking the medication(s) as directed    If the emergency physician has read X-rays, these will be re-interpreted by a radiologist.  If there is a significant

## (undated) NOTE — MR AVS SNAPSHOT
[facility] [address] [address] [phone]               Thank you for choosing us for your health care visit with ALLAN Barton.   We are glad to serve you and happy to provide you with this summar Amitriptyline HCl 25 MG Tabs   Take 125 mg by mouth nightly.    Commonly known as:  ELAVIL           BOTOX 200 units Solr   Generic drug:  OnabotulinumtoxinA   Physician to Inject into selected selected sites in face and neck           Conj Estrog-Medroxyp Choose whole grain products Foods high in sodium   Water is best for hydration Fast food.    Eat at home when possible     Tips for increasing your physical activity – Adults who are physically active are less likely to develop some chronic diseases than ad

## (undated) NOTE — MR AVS SNAPSHOT
[address], [place] [id number]               Thank you for choosing us for your health care visit with Anuja Cook MD.  We are glad to serve you and happy to provide you with this Generic drug:  Ibuprofen   Take 2 capsules by mouth daily as needed. Albuterol Sulfate 1.25 MG/3ML Nebu   Inhale 2.5 mg into the lungs. What changed:  Another medication with the same name was removed.  Continue taking this medication, and [facility] - [address] [place] [address], [phone], [phone]  [address] [address]    Hours:  24-hours Phone:  [phone]    - Verapamil HCl  MG Cp24            MyChart                  Visit [facility] online at

## (undated) NOTE — Clinical Note
Patient is coming for hospital follow up on 10/26/17. Patient stated she is doing okay since discharge. Pt did deny services from [facility]. Pt stated the nurse called and she refused to have them come to her home. Pt stated she does not need it.  Than